# Patient Record
Sex: MALE | URBAN - METROPOLITAN AREA
[De-identification: names, ages, dates, MRNs, and addresses within clinical notes are randomized per-mention and may not be internally consistent; named-entity substitution may affect disease eponyms.]

---

## 2020-07-29 ENCOUNTER — OFFICE VISIT (OUTPATIENT)
Dept: URBAN - METROPOLITAN AREA CLINIC 37 | Facility: CLINIC | Age: 35
End: 2020-07-29

## 2020-07-29 VITALS — BODY MASS INDEX: 26.6 KG/M2 | HEIGHT: 71 IN | WEIGHT: 190 LBS

## 2020-07-29 PROBLEM — 371132002: Status: ACTIVE | Noted: 2020-07-29

## 2020-07-29 PROBLEM — 266435005: Status: ACTIVE | Noted: 2020-07-29

## 2020-07-29 RX ORDER — ALUMINUM ZIRCONIUM TRICHLOROHYDREX GLY 0.19 G/G
1 TABLET 30 MINUTES BEFORE MORNING MEAL STICK TOPICAL ONCE A DAY
Status: ACTIVE | COMMUNITY

## 2020-07-29 RX ORDER — DULOXETINE 20 MG/1
1 CAPSULE CAPSULE, DELAYED RELEASE PELLETS ORAL ONCE A DAY
Status: ACTIVE | COMMUNITY

## 2020-07-29 RX ORDER — NAPROXEN SODIUM 220 MG/1
1 TABLET WITH FOOD OR MILK AS NEEDED TABLET ORAL
Status: ACTIVE | COMMUNITY

## 2020-07-29 RX ORDER — OMEPRAZOLE 40 MG/1
1 CAPSULE CAPSULE, DELAYED RELEASE ORAL
Qty: 60 | Refills: 0 | OUTPATIENT
Start: 2020-07-29

## 2020-07-29 NOTE — HPI-MIGRATED HPI
Initial consultation : Patient is here for -> Acid Reflux:  Onset of symptoms: 10 years ago  Characterisitics: Burning sensation in the chest and ocassional acidic taste with "liquid" coming up his mouth which causes him to choke Associated symptoms: coughing at night Aggravating factors: Certain foods cause a flare up Medications tried: OTC Prilosec with mininal improvement, Tums, Peptobismol Tests/evaluations done previously: [place] [facility] [place] except for findings of a hiatal hernia ;

## 2020-09-02 ENCOUNTER — OFFICE VISIT (OUTPATIENT)
Dept: URBAN - METROPOLITAN AREA CLINIC 37 | Facility: CLINIC | Age: 35
End: 2020-09-02

## 2020-09-02 RX ORDER — DULOXETINE 20 MG/1
1 CAPSULE CAPSULE, DELAYED RELEASE PELLETS ORAL ONCE A DAY
COMMUNITY

## 2020-09-02 RX ORDER — ALUMINUM ZIRCONIUM TRICHLOROHYDREX GLY 0.19 G/G
1 TABLET 30 MINUTES BEFORE MORNING MEAL STICK TOPICAL ONCE A DAY
COMMUNITY

## 2020-09-02 RX ORDER — NAPROXEN SODIUM 220 MG/1
1 TABLET WITH FOOD OR MILK AS NEEDED TABLET ORAL
COMMUNITY

## 2020-09-02 RX ORDER — OMEPRAZOLE 40 MG/1
1 CAPSULE CAPSULE, DELAYED RELEASE ORAL
Qty: 60 | Refills: 0 | COMMUNITY
Start: 2020-07-29

## 2020-09-22 ENCOUNTER — OFFICE VISIT (OUTPATIENT)
Dept: URBAN - METROPOLITAN AREA CLINIC 37 | Facility: CLINIC | Age: 35
End: 2020-09-22

## 2020-09-22 RX ORDER — OMEPRAZOLE 40 MG/1
1 CAPSULE CAPSULE, DELAYED RELEASE ORAL
Qty: 60 | Refills: 0 | OUTPATIENT

## 2020-09-22 RX ORDER — DULOXETINE 20 MG/1
1 CAPSULE CAPSULE, DELAYED RELEASE PELLETS ORAL ONCE A DAY
COMMUNITY

## 2020-09-22 RX ORDER — OMEPRAZOLE 40 MG/1
1 CAPSULE CAPSULE, DELAYED RELEASE ORAL
Qty: 60 | Refills: 0 | COMMUNITY
Start: 2020-07-29

## 2020-09-22 RX ORDER — ALUMINUM ZIRCONIUM TRICHLOROHYDREX GLY 0.19 G/G
1 TABLET 30 MINUTES BEFORE MORNING MEAL STICK TOPICAL ONCE A DAY
COMMUNITY

## 2020-09-22 RX ORDER — NAPROXEN SODIUM 220 MG/1
1 TABLET WITH FOOD OR MILK AS NEEDED TABLET ORAL
COMMUNITY

## 2020-09-22 NOTE — HPI-MIGRATED HPI
Follow up OV : Patient is on -> a trial of Omeprazole 40 mg BID x 1 week and has tapered to once a day, as advised last OV . However, patient still takes BID due to the occurence of symptoms Plan to ordered a GES to r/o gastroparesis if with persistent symptoms He still feels nausea. [facility] and was called to confirm for H.pylori by stool tests this Thursday His BM is regularly once a day;   Follow up OV : Patient is here for a routine OV for -> GERD;   Follow up OV : Current symptoms are -> still has symptoms;   Follow up OV : Last OV was -> 7 weeks ago Last EGD done last year in [facility] with endoscopic evidence of a hiatal hernia;